# Patient Record
Sex: MALE | Race: OTHER
[De-identification: names, ages, dates, MRNs, and addresses within clinical notes are randomized per-mention and may not be internally consistent; named-entity substitution may affect disease eponyms.]

---
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
## 2018-10-23 ENCOUNTER — HOSPITAL ENCOUNTER (EMERGENCY)
Dept: HOSPITAL 62 - ER | Age: 18
Discharge: HOME | End: 2018-10-23
Payer: SELF-PAY

## 2018-10-23 VITALS — DIASTOLIC BLOOD PRESSURE: 70 MMHG | SYSTOLIC BLOOD PRESSURE: 128 MMHG

## 2018-10-23 DIAGNOSIS — R59.0: ICD-10-CM

## 2018-10-23 DIAGNOSIS — R05: ICD-10-CM

## 2018-10-23 DIAGNOSIS — K08.89: ICD-10-CM

## 2018-10-23 DIAGNOSIS — F17.210: ICD-10-CM

## 2018-10-23 DIAGNOSIS — K04.7: ICD-10-CM

## 2018-10-23 DIAGNOSIS — J02.9: Primary | ICD-10-CM

## 2018-10-23 DIAGNOSIS — F12.10: ICD-10-CM

## 2018-10-23 PROCEDURE — 99282 EMERGENCY DEPT VISIT SF MDM: CPT

## 2018-10-23 NOTE — ER DOCUMENT REPORT
ED General





- General


Chief Complaint: Sore Throat


Stated Complaint: SORE THROAT, COUGH, HEADACHE


Time Seen by Provider: 10/23/18 11:07


Notes: 





Patient is a 18-year-old male that presents to the emergency department for 

chief complaint of sore throat and tooth pain.  Patient states he has been 

having a sore throat over the last 2 days, with associated cough, and feels 

like his lymph nodes are swollen.  He says painful for swallowing liquids and 

foods.  He is also noticed pain in his right lower tooth, and felt a "bump" on 

the side of it, and pain with eating.  He wanted this evaluated as well.  He 

denies noting any fevers, chills, night sweats, nausea, vomiting or abdominal 

pain.





Past Medical History: Denies chronic medical conditions


Past Surgical History: Denies major surgical history


Social History: Admits to smoking cigarettes, denies alcohol or drug use


Family History: Reviewed and noncontributory for presenting illness


Allergies: Reviewed, see documented allergy list. 





REVIEW OF SYSTEMS:


Unless otherwise stated in this report the patient's positive and negative 

responses for review of systems for constitutional, eyes, ENT, cardiovascular, 

respiratory, gastrointestinal, neurological, genitourinary, musculoskeletal, 

and integumentary systems and related systems to the presenting problem are 

either as stated in the HPI or were not pertinent or were negative for the 

symptoms and/or complaints related to the presenting medical problem.





PHYSICAL EXAMINATION:





Vital signs reviewed, nursing noted reviewed. 





GENERAL: Well-appearing, well-nourished and in no acute distress.





HEAD: Atraumatic, normocephalic.





EYES: Eyes appear normal, extraocular movements intact, sclera anicteric, 

conjunctiva are normal.





ENT: nares patent, mild tonsillar erythema, and edema, no exudates noted, Moist 

mucous membranes.  Tooth #29, has erythema along the gumline, and tenderness to 

palpate, possible early abscess versus dental infection





NECK: Normal range of motion, mild bilateral tender cervical lymphadenopathy





LUNGS: Breath sounds clear to auscultation bilaterally and equal.  No wheezes 

rales or rhonchi.





HEART: Regular rate and rhythm without murmurs





ABDOMEN: Soft, nontender, normoactive bowel sounds.  No rebound, guarding, or 

rigidity. No masses appreciated.





EXTREMITIES: Nontender, good range of motion, no pitting or edema.  





NEUROLOGICAL: No focal neurological deficits. Moves all extremities 

spontaneously Motor and sensory grossly intact on exam.





PSYCH: Normal mood, normal affect.





SKIN: Warm, Dry, normal turgor, no rashes or lesions noted on exposed skin





TRAVEL OUTSIDE OF THE [place] IN LAST 30 DAYS: No





- Related Data


Allergies/Adverse Reactions: 


 





No Known Allergies Allergy (Verified 10/23/18 10:44)


 








Home Medications: ancetame





Past Medical History





- Social History


Smoking Status: Current Some Day Smoker


Frequency of alcohol use: None


Drug Abuse: Marijuana


Family History: Reviewed & Not Pertinent


Patient has suicidal ideation: No


Patient has homicidal ideation: No


Renal/ Medical History: Denies: Hx Peritoneal Dialysis


GI Medical History: Denies: Hx Crohn's Disease, Hx Diverticulitis, Hx 

Gastroesophageal Reflux Disease, Hx Irritable Bowel





- Immunizations


Immunizations up to date: Yes


Hx Diphtheria, Pertussis, Tetanus Vaccination: Yes





Physical Exam





- Vital signs


Vitals: 


 











Temp Pulse Resp BP Pulse Ox


 


 98.5 F   76   14   128/70 H  95 


 


 10/23/18 11:05  10/23/18 11:05  10/23/18 11:05  10/23/18 11:05  10/23/18 11:05














Course





- Re-evaluation


Re-evalutation: 





Patient seen and examined, symptoms of pharyngitis, and what appeared to be an 

early dental infection versus abscess, will place the patient on amoxicillin 

for 10 days, a 75 twice daily, advised salt water swishes and swallows, and 

Chloraseptic spray if needed for his sore throat.  Patient was agreeable and 

discharged home.








- Vital Signs


Vital signs: 


 











Temp Pulse Resp BP Pulse Ox


 


 98.5 F   76   14   128/70 H  95 


 


 10/23/18 11:05  10/23/18 11:05  10/23/18 11:05  10/23/18 11:05  10/23/18 11:05














Discharge





- Discharge


Clinical Impression: 


 Tooth infection





Acute pharyngitis


Qualifiers:


 Pharyngitis/tonsillitis etiology: unspecified etiology Qualified Code(s): 

J02.9 - Acute pharyngitis, unspecified





Condition: Stable


Disposition: HOME, SELF-CARE


Instructions:  Sore Throat ([facility])


Additional Instructions: 


Please return to the emergency department if you have any worsening, or concern 

of your symptoms.





Please return to the emergency department if you develop chest pain, difficulty 

breathing, severe abdominal pain, or ongoing vomiting.





Please follow-up with your primary care physician in 2-3 days and any other 

recommended physicians.





If prescribed, take all medications as directed.  





If you have any questions or concerns do not hesitate to return the emergency 

department for evaluation. 


Prescriptions: 


Amoxicillin Trihydrate [Amoxil 875 mg Tablet] 1 tab PO BID #20 tablet


Forms:  Return to Work


Referrals: 


MANUEL JENSEN MD [ACTIVE STAFF] - Follow up in 3-5 days (or your primary 

care. )

## 2019-08-16 ENCOUNTER — HOSPITAL ENCOUNTER (EMERGENCY)
Dept: HOSPITAL 62 - ER | Age: 19
Discharge: HOME | End: 2019-08-16
Payer: SELF-PAY

## 2019-08-16 VITALS — DIASTOLIC BLOOD PRESSURE: 85 MMHG | SYSTOLIC BLOOD PRESSURE: 109 MMHG

## 2019-08-16 DIAGNOSIS — R51: ICD-10-CM

## 2019-08-16 DIAGNOSIS — K08.9: Primary | ICD-10-CM

## 2019-08-16 PROCEDURE — 99282 EMERGENCY DEPT VISIT SF MDM: CPT

## 2019-08-16 NOTE — ER DOCUMENT REPORT
HPI





- HPI


Time Seen by Provider: 08/16/19 19:13


Pain Level: 2


Notes: 





Patient is an otherwise healthy 19-year-old male presented to the emergency 

department chief complaint of dental pain.  Patient reports pain to the right 

side of his mouth on the upper and lower around teeth #1, 2, 31 and 32.  He 

states this pain has been present for approximately 3 to 4 days.  He denies any 

fevers, nausea, vomiting or chills.  He has not seen a dentist in several years.

 He denies any difficulty swallowing or speaking.








- CONSTITUTIONAL


Constitutional: DENIES: Fever, Chills





- EENT


EENT: DENIES: Sore Throat, Ear Pain, Eye problems





- NEURO


Neurology: REPORTS: Headache.  DENIES: Weakness, Vision blurred, Dizzinesss / 

Vertigo





- CARDIOVASCULAR


Cardiovascular: DENIES: Chest pain





- RESPIRATORY


Respiratory: DENIES: Trouble Breathing, Coughing





- GASTROINTESTINAL


Gastrointestinal: DENIES: Abdominal Pain, Black / Bloody Stools





- URINARY


Urinary: DENIES: Dysuria, Urgency, Frequency





- MUSCULOSKELETAL


Musculoskeletal: DENIES: Extremity pain





Past Medical History





- General


Information source: Patient





- Social History


Smoking Status: Never Smoker


Chew tobacco use (# tins/day): No


Frequency of alcohol use: None


Drug Abuse: None


Family History: Reviewed & Not Pertinent


Patient has suicidal ideation: No


Patient has homicidal ideation: No





- Medical History


Medical History: Negative


Renal/ Medical History: Denies: Hx Peritoneal Dialysis


GI Medical History: Denies: Hx Crohn's Disease, Hx Diverticulitis, Hx 

Gastroesophageal Reflux Disease, Hx Irritable Bowel


Surgical Hx: Negative





- Immunizations


Immunizations up to date: Yes


Hx Diphtheria, Pertussis, Tetanus Vaccination: Yes





Vertical Provider Document





- CONSTITUTIONAL


Notes: 





PHYSICAL EXAMINATION:





GENERAL: Well-appearing, well-nourished and in no acute distress.





HEAD: Atraumatic, normocephalic.





EYES: Pupils equal round extraocular movements intact,  conjunctiva are normal.





ENT: Nares patent, poor dentition noted throughout, no drainable abscess 

identified, erythema noted around teeth #1, 2, 31 and 32.  No trismus.





NECK: Normal range of motion





LUNGS: No respiratory distress





Musculoskeletal: Normal range of motion





NEUROLOGICAL:  Normal speech, normal gait. 





PSYCH: Normal mood, normal affect.





SKIN: Warm, Dry, normal turgor, no rashes or lesions noted.





- INFECTION CONTROL


TRAVEL OUTSIDE OF THE [place] IN LAST 30 DAYS: No





Course





- Re-evaluation


Re-evalutation: 





Patient is an otherwise healthy 19-year-old male in no acute distress.  He is 

nontoxic in appearance and his vital signs are within normal limits.  He has no 

drainable abscess identified.  I will start him on antibiotics and discharge him

home.  Patient will follow-up with the [facility] dental clinic.





The patient's emergency department workup and current diagnosis were explained 

to the patient and or family.  Follow-up instructions were provided.  

Medications if prescribed were discussed. Instructions for when to return to the

emergency department including specific worrisome symptoms were discussed with 

the patient and/or family.








- Vital Signs


Vital signs: 


                                        











Temp Pulse Resp BP Pulse Ox


 


 98.5 F   74   14   109/85   96 


 


 08/16/19 18:37  08/16/19 18:37  08/16/19 18:37  08/16/19 18:37  08/16/19 18:37














Discharge





- Discharge


Clinical Impression: 


 Pain, dental





Condition: Stable


Disposition: HOME, SELF-CARE


Additional Instructions: 


You have been seen for dental pain.  It is very important that you follow-up 

with a dentist for definitive care.  Please return if you develop fever greater 

than 101, swelling in your face, vomiting, difficulty breathing or swallowing, 

or any other symptoms that are concerning to you.  For pain you should take 

ibuprofen 600 mg every 6 hours as needed.  Take the antibiotic as prescribed, 

finish the entire course even if your symptoms resolve.  Follow-up with the 

[facility] dental clinic for further management of your dental issues.





[facility] dental clinic


[phone]


Prescriptions: 


Penicillin V Potassium [Penicillin Vk 500 mg Tablet] 500 mg PO BID #20 tablet


Prednisone [Deltasone 10 mg Tablet] 10 mg PO ASDIR PRN #21 tablet


 PRN Reason: 


Forms:  Return to Work

## 2019-09-12 ENCOUNTER — HOSPITAL ENCOUNTER (EMERGENCY)
Dept: HOSPITAL 62 - ER | Age: 19
Discharge: HOME | End: 2019-09-12
Payer: SELF-PAY

## 2019-09-12 VITALS — DIASTOLIC BLOOD PRESSURE: 80 MMHG | SYSTOLIC BLOOD PRESSURE: 147 MMHG

## 2019-09-12 DIAGNOSIS — F17.210: ICD-10-CM

## 2019-09-12 DIAGNOSIS — M25.571: Primary | ICD-10-CM

## 2019-09-12 PROCEDURE — 99283 EMERGENCY DEPT VISIT LOW MDM: CPT

## 2019-09-12 NOTE — RADIOLOGY REPORT (SQ)
EXAM DESCRIPTION:  ANKLE RIGHT COMPLETE



COMPLETED DATE/TIME:  9/12/2019 4:02 pm



REASON FOR STUDY:  twisted last night now pain



COMPARISON:  None.



NUMBER OF VIEWS:  Three views.



TECHNIQUE:  AP, lateral, and oblique radiographic images acquired of the right ankle.



LIMITATIONS:  None.



FINDINGS:  MINERALIZATION: Normal.

BONES: No acute fracture or dislocation.  No worrisome bone lesions.

JOINTS: No effusions.  Normal alignment at the ankle mortise

SOFT TISSUES: Lateral soft tissue swelling. No foreign body.

OTHER: No other significant finding.



IMPRESSION:  Lateral soft tissue swelling without acute fracture or malalignment



TECHNICAL DOCUMENTATION:  JOB ID:  [id number]

 2011 [employer]- All Rights Reserved



Reading location - IP/workstation name: [email or web address]

## 2019-09-12 NOTE — ER DOCUMENT REPORT
HPI





- HPI


Patient complains to provider of: Right ankle pain


Time Seen by Provider: 09/12/19 15:24


Onset: Yesterday


Onset/Duration: Sudden


Quality of pain: Achy


Severity: Moderate


Pain Level: 3


Context: 





This 19-year-old male presents emergency department with complaints of right 

ankle pain.  Reports he fell and twisted last night.  Reports he has not been 

able to walk on it since then.  Denies past medical history of injury to the 

ankle.  Patient has it wrapped and is very secure brace.  This was removed.  

Patient has not taken anything for pain.  Motrin offered and accepted.


Associated Symptoms: None


Exacerbated by: Movement, Walking


Relieved by: Denies


Similar symptoms previously: No


Recently seen / treated by doctor: No





- CONSTITUTIONAL


Constitutional: DENIES: Fever, Chills





- MUSCULOSKELETAL


Musculoskeletal: REPORTS: Extremity pain - right ankle





Past Medical History





- General


Information source: Patient





- Social History


Smoking Status: Current Some Day Smoker


Cigarette use (# per day): Yes


Frequency of alcohol use: Occasional


Drug Abuse: None


Occupation: [employer]


Family History: Reviewed & Not Pertinent


Patient has suicidal ideation: No


Patient has homicidal ideation: No





- Medical History


Medical History: Negative


Renal/ Medical History: Denies: Hx Peritoneal Dialysis


GI Medical History: Denies: Hx Crohn's Disease, Hx Diverticulitis, Hx Gastro

esophageal Reflux Disease, Hx Irritable Bowel


Surgical Hx: Negative





- Immunizations


Immunizations up to date: Yes


Hx Diphtheria, Pertussis, Tetanus Vaccination: Yes





Vertical Provider Document





- CONSTITUTIONAL


Agree With Documented VS: Yes


Exam Limitations: No Limitations


General Appearance: WD/WN, No Apparent Distress





- INFECTION CONTROL


TRAVEL OUTSIDE OF THE [place] IN LAST 30 DAYS: No





- HEENT


HEENT: Atraumatic, Normocephalic





- NECK


Neck: Supple





- RESPIRATORY


Respiratory: No Respiratory Distress





- CARDIOVASCULAR


Cardiovascular: Regular Rate





- MUSCULOSKELETAL/EXTREMETIES


Musculoskeletal/Extremeties: Tender - Right ankle tender to palp patient no 

obvious deformity good cap refill good pedal pulse, Eccymosis





- NEURO


Level of Consciousness: Awake, Alert, Appropriate


Motor/Sensory: No Motor Deficit





- DERM


Integumentary: Warm, Dry


Adult Front & Back Diagram: 


                            __________________________














                            __________________________





 1 - Patient complains of pain.  Positive ecchymosis positive pedal pulse good 

cap refill








Course





- Re-evaluation


Re-evalutation: 





09/12/19 15:46


Patient presents emergency department with right ankle pain after he fell and 

twisted last night.  No obvious deformity positive ecchymosis.  Patient had 

wrapped his ankle in a very secure laced up brace.  Brace was removed.  Patient 

was instructed to not reapply the brace.


 





X-ray negative for acute fracture.  Patient was placed in Ace wrap instructed on

crutches instructed to rest ice elevate and follow-up with orthopedic for 

continued pain.  He verbalized understanding for all instructions











Dictation of this chart was performed using voice recognition software; 

therefore, there may be some unintended grammatical errors.





                                        





Ankle X-Ray  09/12/19 15:16


IMPRESSION:  Lateral soft tissue swelling without acute fracture or malalignment


 

















- Vital Signs


Vital signs: 


                                        











Temp Pulse Resp BP Pulse Ox


 


 98.4 F   93   18   147/80 H  91 L


 


 09/12/19 15:02  09/12/19 15:02  09/12/19 15:02  09/12/19 15:02  09/12/19 15:02














- Diagnostic Test


Radiology reviewed: Image reviewed, Reports reviewed





Procedures





- Immobilization


  ** Right Ankle


Immobilizer type: Ace wrap


Performed by: PCT


Post-Proc Neuro Vasc Exam: Unchanged from pre-exam


Alignment checked and good: Yes





Discharge





- Discharge


Clinical Impression: 


 Right ankle pain





Condition: Stable


Disposition: HOME, SELF-CARE


Instructions:  Ace Wrap ([facility]), Use of Crutches ([facility]), Ice & Elevation ([facility])


Additional Instructions: 


*You have been evaluated for an ankle injury


*Rest/Ice/Elevate your ankle


*Maintain the ace wrap and use your crutches for the next three days


*Follow up with orthopedics within one week for recheck 


*Take Motrin as indicated for pain


*Return to ED for worsening condition, changes, needs











Monitor your blood pressure. Your blood pressure was elevated today.  This may 

be because you were anxious, in pain or because you need medication.  It is 

important to follow up with your primary care provider for full evaluation.





Forms:  Elevated Blood Pressure, Return to Work

## 2019-09-18 ENCOUNTER — HOSPITAL ENCOUNTER (EMERGENCY)
Dept: HOSPITAL 62 - ER | Age: 19
Discharge: HOME | End: 2019-09-18
Payer: SELF-PAY

## 2019-09-18 VITALS — DIASTOLIC BLOOD PRESSURE: 78 MMHG | SYSTOLIC BLOOD PRESSURE: 126 MMHG

## 2019-09-18 DIAGNOSIS — M25.571: Primary | ICD-10-CM

## 2019-09-18 DIAGNOSIS — S96.911D: ICD-10-CM

## 2019-09-18 DIAGNOSIS — F17.200: ICD-10-CM
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
## 2019-09-18 PROCEDURE — 99281 EMR DPT VST MAYX REQ PHY/QHP: CPT

## 2019-09-18 NOTE — ER DOCUMENT REPORT
HPI





- HPI


Time Seen by Provider: 09/18/19 16:03


Pain Level: 3


Notes: 





Patient is a 19-year-old male presenting to the emergency department with right 

ankle pain.  Patient reports he was seen here 1 week ago, diagnosed with an 

ankle strain or sprain and encouraged to follow-up with orthopedics if not 

improving.  Patient reports he has not seen orthopedics nor has he seen his 

primary care provider.  He states that at his job he has to walk upstairs and he

states that he needs a work note to clear him for longer.








- NEURO


Neurology: DENIES: Headache, Weakness, Vision blurred, Dizzinesss / Vertigo





- MUSCULOSKELETAL


Musculoskeletal: REPORTS: Extremity pain - Right Ankle





Past Medical History





- General


Information source: Patient





- Social History


Smoking Status: Current Some Day Smoker


Chew tobacco use (# tins/day): No


Frequency of alcohol use: Social


Drug Abuse: None


Family History: Reviewed & Not Pertinent


Patient has suicidal ideation: No


Patient has homicidal ideation: No





- Medical History


Medical History: Negative


Renal/ Medical History: Denies: Hx Peritoneal Dialysis


GI Medical History: Denies: Hx Crohn's Disease, Hx Diverticulitis, Hx 

Gastroesophageal Reflux Disease, Hx Irritable Bowel


Surgical Hx: Negative





- Immunizations


Immunizations up to date: Yes


Hx Diphtheria, Pertussis, Tetanus Vaccination: Yes





Vertical Provider Document





- CONSTITUTIONAL


Notes: 





PHYSICAL EXAMINATION:





GENERAL: Well-appearing, well-nourished and in no acute distress.





HEAD: Atraumatic, normocephalic.





EYES: Pupils equal round extraocular movements intact,  conjunctiva are normal.





ENT: Nares patent





NECK: Normal range of motion





LUNGS: No respiratory distress





Musculoskeletal: Normal range of motion, tenderness to palpation to medial and 

lateral right ankle.  Strong dorsalis pedis pulse, cap refill less than 3 

seconds.  Ecchymosis





NEUROLOGICAL:  Normal speech. 





PSYCH: Normal mood, normal affect.





SKIN: Warm, Dry, normal turgor, no rashes or lesions noted.  Erythema and 

ecchymosis noted to right ankle, no warmth felt.








- INFECTION CONTROL


TRAVEL OUTSIDE OF THE [place] IN LAST 30 DAYS: No





Course





- Re-evaluation


Re-evalutation: 





X-rays reviewed from previous visit, no fracture was noted.  Patient will be 

given a work note for today.  He will be given a work note stating that he is to

be allowed to use his crutches while at work and to please allow him to not use 

stairs if possible.  Patient is in agreement with this plan.  No further 

treatment or work-up necessary.





The patient's emergency department workup and current diagnosis were explained 

to the patient and or family.  Follow-up instructions were provided.  

Medications if prescribed were discussed. Instructions for when to return to the

emergency department including specific worrisome symptoms were discussed with 

the patient and/or family.








- Vital Signs


Vital signs: 


                                        











Temp Pulse Resp BP Pulse Ox


 


 98.0 F   71   15   126/78 H  97 


 


 09/18/19 16:08  09/18/19 16:08  09/18/19 16:08  09/18/19 16:08  09/18/19 16:08














Discharge





- Discharge


Clinical Impression: 


 Right ankle pain





Condition: Stable


Disposition: HOME, SELF-CARE


Additional Instructions: 


Please continue to use your crutches.


Please continue to take ibuprofen 600 mg every 6 hours.


Please continue to ice and elevate the extremity as much as possible.


Please follow-up with orthopedics, call them to schedule appointment.


All work notes are enclosed as per our discussion.


Return to the emergency department with any new or worsening symptoms.





Forms:  Special Work Note, Return to Work


Referrals: 


ADEN FERNANDEZ,  [ACTIVE STAFF] - Follow up as needed

## 2020-08-11 ENCOUNTER — HOSPITAL ENCOUNTER (EMERGENCY)
Dept: HOSPITAL 62 - ER | Age: 20
Discharge: HOME | End: 2020-08-11
Payer: SELF-PAY

## 2020-08-11 VITALS — DIASTOLIC BLOOD PRESSURE: 87 MMHG | SYSTOLIC BLOOD PRESSURE: 121 MMHG

## 2020-08-11 DIAGNOSIS — R05: ICD-10-CM

## 2020-08-11 DIAGNOSIS — Z20.828: ICD-10-CM

## 2020-08-11 DIAGNOSIS — R07.9: ICD-10-CM

## 2020-08-11 DIAGNOSIS — R07.2: Primary | ICD-10-CM

## 2020-08-11 DIAGNOSIS — F17.200: ICD-10-CM

## 2020-08-11 DIAGNOSIS — R06.02: ICD-10-CM

## 2020-08-11 LAB
ADD MANUAL DIFF: NO
ALBUMIN SERPL-MCNC: 4.6 G/DL (ref 3.5–5)
ALP SERPL-CCNC: 70 U/L (ref 38–126)
ANION GAP SERPL CALC-SCNC: 8 MMOL/L (ref 5–19)
APPEARANCE UR: CLEAR
APTT PPP: YELLOW S
AST SERPL-CCNC: 24 U/L (ref 17–59)
BASOPHILS # BLD AUTO: 0 10^3/UL (ref 0–0.2)
BASOPHILS NFR BLD AUTO: 0.4 % (ref 0–2)
BILIRUB DIRECT SERPL-MCNC: 0 MG/DL (ref 0–0.4)
BILIRUB SERPL-MCNC: 0.5 MG/DL (ref 0.2–1.3)
BILIRUB UR QL STRIP: NEGATIVE
BUN SERPL-MCNC: 12 MG/DL (ref 7–20)
CALCIUM: 9.5 MG/DL (ref 8.4–10.2)
CHLORIDE SERPL-SCNC: 102 MMOL/L (ref 98–107)
CO2 SERPL-SCNC: 30 MMOL/L (ref 22–30)
EOSINOPHIL # BLD AUTO: 0.2 10^3/UL (ref 0–0.6)
EOSINOPHIL NFR BLD AUTO: 1.9 % (ref 0–6)
ERYTHROCYTE [DISTWIDTH] IN BLOOD BY AUTOMATED COUNT: 15.4 % (ref 11.5–14)
GLUCOSE SERPL-MCNC: 111 MG/DL (ref 75–110)
GLUCOSE UR STRIP-MCNC: NEGATIVE MG/DL
HCT VFR BLD CALC: 46.9 % (ref 37.9–51)
HGB BLD-MCNC: 16.3 G/DL (ref 13.5–17)
KETONES UR STRIP-MCNC: NEGATIVE MG/DL
LYMPHOCYTES # BLD AUTO: 3.7 10^3/UL (ref 0.5–4.7)
LYMPHOCYTES NFR BLD AUTO: 35.4 % (ref 13–45)
MCH RBC QN AUTO: 26.8 PG (ref 27–33.4)
MCHC RBC AUTO-ENTMCNC: 34.7 G/DL (ref 32–36)
MCV RBC AUTO: 77 FL (ref 80–97)
MONOCYTES # BLD AUTO: 0.9 10^3/UL (ref 0.1–1.4)
MONOCYTES NFR BLD AUTO: 8.1 % (ref 3–13)
NEUTROPHILS # BLD AUTO: 5.7 10^3/UL (ref 1.7–8.2)
NEUTS SEG NFR BLD AUTO: 54.2 % (ref 42–78)
NITRITE UR QL STRIP: NEGATIVE
PH UR STRIP: 6 [PH] (ref 5–9)
PLATELET # BLD: 218 10^3/UL (ref 150–450)
POTASSIUM SERPL-SCNC: 4.2 MMOL/L (ref 3.6–5)
PROT SERPL-MCNC: 8.1 G/DL (ref 6.3–8.2)
PROT UR STRIP-MCNC: NEGATIVE MG/DL
RBC # BLD AUTO: 6.06 10^6/UL (ref 4.35–5.55)
SP GR UR STRIP: 1.02
TOTAL CELLS COUNTED % (AUTO): 100 %
UROBILINOGEN UR-MCNC: NEGATIVE MG/DL (ref ?–2)
WBC # BLD AUTO: 10.5 10^3/UL (ref 4–10.5)

## 2020-08-11 PROCEDURE — C9803 HOPD COVID-19 SPEC COLLECT: HCPCS

## 2020-08-11 PROCEDURE — 85025 COMPLETE CBC W/AUTO DIFF WBC: CPT

## 2020-08-11 PROCEDURE — 36415 COLL VENOUS BLD VENIPUNCTURE: CPT

## 2020-08-11 PROCEDURE — 71045 X-RAY EXAM CHEST 1 VIEW: CPT

## 2020-08-11 PROCEDURE — 93010 ELECTROCARDIOGRAM REPORT: CPT

## 2020-08-11 PROCEDURE — 87635 SARS-COV-2 COVID-19 AMP PRB: CPT

## 2020-08-11 PROCEDURE — 99285 EMERGENCY DEPT VISIT HI MDM: CPT

## 2020-08-11 PROCEDURE — 81001 URINALYSIS AUTO W/SCOPE: CPT

## 2020-08-11 PROCEDURE — 80053 COMPREHEN METABOLIC PANEL: CPT

## 2020-08-11 PROCEDURE — 93005 ELECTROCARDIOGRAM TRACING: CPT

## 2020-08-11 NOTE — RADIOLOGY REPORT (SQ)
EXAM DESCRIPTION:  CHEST SINGLE VIEW



IMAGES COMPLETED DATE/TIME:  8/11/2020 10:42 am



REASON FOR STUDY:  cough shortness of breath



COMPARISON:  None.



EXAM PARAMETERS:  NUMBER OF VIEWS: One view.

TECHNIQUE: Single frontal radiographic view of the chest acquired.

RADIATION DOSE: NA

LIMITATIONS: None.



FINDINGS:  LUNGS AND PLEURA: No opacities, masses or pneumothorax. No pleural effusion.

MEDIASTINUM AND HILAR STRUCTURES: No masses.  Contour normal.

HEART AND VASCULAR STRUCTURES: Heart normal in size.  Normal vasculature.

BONES: No acute findings.

HARDWARE: None in the chest.

OTHER: No other significant finding.



IMPRESSION:  NO ACUTE RADIOGRAPHIC FINDING IN THE CHEST.



TECHNICAL DOCUMENTATION:  JOB ID:  [id number]

 2011 [employer]- All Rights Reserved



Reading location - IP/workstation name: [email or web address]

## 2020-08-11 NOTE — ER DOCUMENT REPORT
ED General





- General


Chief Complaint: Cough


Stated Complaint: SHORTNESS OF BREATH


Time Seen by Provider: 08/11/20 10:17


Mode of Arrival: Ambulatory


Information source: Patient


TRAVEL OUTSIDE OF THE [place] IN LAST 30 DAYS: No





- HPI


Notes: 





Patient presents with approximately 1 month of intermittent random chest pain is

located substernal and on the left side.  No significant radiation of the pain. 

He does not know of anything that makes the pain better or worse.  Nothing makes

the pain come on.  He denies any significant family history of lung or heart 

issues.  He has no significant past medical history.  No trauma.  He states he 

has had a dry cough for about 1 month.  He states he had a COVID test 

approximately 3 weeks ago that was negative.  No fevers.  Patient states he 

feels occasionally short of breath when he exerts himself.  His symptoms have 

been mild to moderate.





- Related Data


Allergies/Adverse Reactions: 


                                        





No Known Allergies Allergy (Verified 08/11/20 10:05)


   











Past Medical History





- General


Information source: Patient





- Social History


Smoking Status: Current Every Day Smoker


Chew tobacco use (# tins/day): No


Frequency of alcohol use: Occasional


Drug Abuse: None


Family History: Reviewed & Not Pertinent


Patient has homicidal ideation: No


Neurological Medical History: Reports: Hx Migraine


Renal/ Medical History: Denies: Hx Peritoneal Dialysis


GI Medical History: Denies: Hx Crohn's Disease, Hx Diverticulitis, Hx 

Gastroesophageal Reflux Disease, Hx Irritable Bowel





- Immunizations


Immunizations up to date: Yes


Hx Diphtheria, Pertussis, Tetanus Vaccination: Yes





Review of Systems





- Review of Systems


Constitutional: denies: Chills, Fever


Cardiovascular: Chest pain.  denies: Palpitations


Respiratory: Cough, Short of breath


-: Yes All other systems reviewed and negative





Physical Exam





- Vital signs


Vitals: 





                                        











Temp


 


 98.9 F 


 


 08/11/20 10:06











Interpretation: Normal





- General


General appearance: Appears well, Alert





- HEENT


Head: Normocephalic, Atraumatic


Eyes: Normal


Pupils: PERRL





- Respiratory


Respiratory status: No respiratory distress


Chest status: Nontender


Breath sounds: Normal


Chest palpation: Normal





- Cardiovascular


Rhythm: Regular


Heart sounds: Normal auscultation


Murmur: No





- Abdominal


Inspection: Normal


Distension: No distension


Bowel sounds: Normal


Tenderness: Nontender


Organomegaly: No organomegaly





- Back


Back: Normal, Nontender





- Extremities


General upper extremity: Normal inspection, Nontender, Normal color, Normal ROM,

Normal temperature


General lower extremity: Normal inspection, Nontender, Normal color, Normal ROM,

Normal temperature, Normal weight bearing.  No: Minnie's sign





- Neurological


Neuro grossly intact: Yes


Cognition: Normal


Orientation: AAOx4


[place] Coma Scale Eye Opening: Spontaneous


[place] Coma Scale Verbal: Oriented


[place] Coma Scale Motor: Obeys Commands


[place] Coma Scale Total: 15


Speech: Normal


Motor strength normal: LUE, RUE, LLE, RLE


Sensory: Normal





- Psychological


Associated symptoms: Normal affect, Normal mood





- Skin


Skin Temperature: Warm


Skin Moisture: Dry


Skin Color: Normal





Course





- Vital Signs


Vital signs: 





                                        











Temp Pulse Resp BP Pulse Ox


 


 98.9 F   77   20   121/87 H  97 


 


 08/11/20 10:07  08/11/20 10:07  08/11/20 10:07  08/11/20 10:07  08/11/20 10:07














- Laboratory


Result Diagrams: 


                                 08/11/20 10:20





                                 08/11/20 10:20


Laboratory results interpreted by me: 





                                        











  08/11/20 08/11/20





  10:15 10:20


 


RBC   6.06 H


 


MCV   77 L


 


MCH   26.8 L


 


RDW   15.4 H


 


Urine Ascorbic Acid  20 H 














- Diagnostic Test


Radiology reviewed: Image reviewed, Reports reviewed





- EKG Interpretation by Me


EKG shows normal: Sinus rhythm


Rate: Normal - 73


Rhythm: NSR


Axis/QRS: No: Right axis deviation, Left axis deviation





Discharge





- Discharge


Clinical Impression: 


 Chest pain at rest





Condition: Stable


Disposition: HOME, SELF-CARE


Instructions:  Chest Pain of Unclear Cause ([facility])


Additional Instructions: 


Please call your primary care physician as soon as possible to arrange follow-

up.  Please try to decrease soda intake, iced tea intake, and vaping as much as 

possible.








Prescriptions: 


Sucralfate [Carafate Susp 1 Gm/10 Ml Udcup] 1 gm PO Q6 #200 ml


Forms:  Return to Work


Referrals: 


[facility] [Provider Group] - Follow up in 3-5 days